# Patient Record
Sex: FEMALE | Race: OTHER | NOT HISPANIC OR LATINO | URBAN - METROPOLITAN AREA
[De-identification: names, ages, dates, MRNs, and addresses within clinical notes are randomized per-mention and may not be internally consistent; named-entity substitution may affect disease eponyms.]

---

## 2017-10-05 ENCOUNTER — OUTPATIENT (OUTPATIENT)
Dept: OUTPATIENT SERVICES | Facility: HOSPITAL | Age: 72
LOS: 1 days | Discharge: HOME | End: 2017-10-05

## 2017-10-11 DIAGNOSIS — H25.12 AGE-RELATED NUCLEAR CATARACT, LEFT EYE: ICD-10-CM

## 2017-10-11 DIAGNOSIS — Z87.891 PERSONAL HISTORY OF NICOTINE DEPENDENCE: ICD-10-CM

## 2017-10-11 DIAGNOSIS — J44.9 CHRONIC OBSTRUCTIVE PULMONARY DISEASE, UNSPECIFIED: ICD-10-CM

## 2017-10-19 ENCOUNTER — OUTPATIENT (OUTPATIENT)
Dept: OUTPATIENT SERVICES | Facility: HOSPITAL | Age: 72
LOS: 1 days | Discharge: HOME | End: 2017-10-19

## 2017-10-25 DIAGNOSIS — Z92.3 PERSONAL HISTORY OF IRRADIATION: ICD-10-CM

## 2017-10-25 DIAGNOSIS — J44.9 CHRONIC OBSTRUCTIVE PULMONARY DISEASE, UNSPECIFIED: ICD-10-CM

## 2017-10-25 DIAGNOSIS — Z87.891 PERSONAL HISTORY OF NICOTINE DEPENDENCE: ICD-10-CM

## 2017-10-25 DIAGNOSIS — Z85.72 PERSONAL HISTORY OF NON-HODGKIN LYMPHOMAS: ICD-10-CM

## 2017-10-25 DIAGNOSIS — Z92.21 PERSONAL HISTORY OF ANTINEOPLASTIC CHEMOTHERAPY: ICD-10-CM

## 2017-10-25 DIAGNOSIS — H25.11 AGE-RELATED NUCLEAR CATARACT, RIGHT EYE: ICD-10-CM

## 2019-11-03 ENCOUNTER — TRANSCRIPTION ENCOUNTER (OUTPATIENT)
Age: 74
End: 2019-11-03

## 2020-04-08 ENCOUNTER — TRANSCRIPTION ENCOUNTER (OUTPATIENT)
Age: 75
End: 2020-04-08

## 2020-06-03 ENCOUNTER — TRANSCRIPTION ENCOUNTER (OUTPATIENT)
Age: 75
End: 2020-06-03

## 2020-09-17 ENCOUNTER — TRANSCRIPTION ENCOUNTER (OUTPATIENT)
Age: 75
End: 2020-09-17

## 2020-10-01 ENCOUNTER — TRANSCRIPTION ENCOUNTER (OUTPATIENT)
Age: 75
End: 2020-10-01

## 2020-10-03 ENCOUNTER — TRANSCRIPTION ENCOUNTER (OUTPATIENT)
Age: 75
End: 2020-10-03

## 2021-04-15 ENCOUNTER — TRANSCRIPTION ENCOUNTER (OUTPATIENT)
Age: 76
End: 2021-04-15

## 2021-04-23 ENCOUNTER — TRANSCRIPTION ENCOUNTER (OUTPATIENT)
Age: 76
End: 2021-04-23

## 2022-07-06 ENCOUNTER — NON-APPOINTMENT (OUTPATIENT)
Age: 77
End: 2022-07-06

## 2023-02-18 ENCOUNTER — NON-APPOINTMENT (OUTPATIENT)
Age: 78
End: 2023-02-18

## 2023-06-01 ENCOUNTER — EMERGENCY (EMERGENCY)
Facility: HOSPITAL | Age: 78
LOS: 1 days | Discharge: LEFT BEFORE DISCHARGE | End: 2023-06-01
Admitting: STUDENT IN AN ORGANIZED HEALTH CARE EDUCATION/TRAINING PROGRAM
Payer: MEDICARE

## 2023-06-01 VITALS
OXYGEN SATURATION: 93 % | TEMPERATURE: 98 F | RESPIRATION RATE: 18 BRPM | DIASTOLIC BLOOD PRESSURE: 94 MMHG | HEART RATE: 65 BPM | SYSTOLIC BLOOD PRESSURE: 166 MMHG

## 2023-06-01 PROCEDURE — L9991: CPT

## 2023-06-01 NOTE — ED ADULT NURSE NOTE - CAS TRG GENERAL AIRWAY, MLM
HR=81 bpm, DBNG=518/86 mmhg, SfH6=302.0 %, Resp=12 B/min, EtCO2=35 mmHg, Apnea=4 Seconds, Pain=0, Wendi=9, Short=2 Patent

## 2023-06-01 NOTE — ED ADULT NURSE NOTE - NSICDXPASTMEDICALHX_GEN_ALL_CORE_FT
PAST MEDICAL HISTORY:  COPD (chronic obstructive pulmonary disease)     History of TIAs     Lymphoma

## 2023-06-01 NOTE — ED ADULT TRIAGE NOTE - ARRIVAL INFO ADDITIONAL COMMENTS
pt sent from [facility] after presenting there with headache x1 week and having episode of transient weakness at 1815.  no symptoms now.

## 2023-06-01 NOTE — ED ADULT NURSE NOTE - NSFALLUNIVINTERV_ED_ALL_ED
Bed/Stretcher in lowest position, wheels locked, appropriate side rails in place/Call bell, personal items and telephone in reach/Instruct patient to call for assistance before getting out of bed/chair/stretcher/Non-slip footwear applied when patient is off stretcher/[place] to call system/Physically safe environment - no spills, clutter or unnecessary equipment/Purposeful proactive rounding/Room/bathroom lighting operational, light cord in reach

## 2023-06-01 NOTE — ED ADULT NURSE NOTE - NURSING NEURO LEVEL OF CONSCIOUSNESS
Addended by: ANAND OLIVEIRA on: 5/22/2023 02:27 PM     Modules accepted: Orders     alert and awake/follows commands

## 2023-06-01 NOTE — ED ADULT NURSE NOTE - OBJECTIVE STATEMENT
79 yo F PMHx lymphoma, TIAs, COPD presents to the ED sent from [facility] after presenting there with headache x 1 week and pt "feeling like I was on a boat" around 1815. Pt awake and alert, AOx4. Pt states, "the doctor did not want to be responsible for sending me home, so he sent me here, but I want to go home. This is silly, I think they are just worried because of my history, but I really just need a head CT and some Tylenol." Pt also reports some nausea. Pt reports the feeling of being on a boat has gone away. Pt breathing spontaneously and unlabored. Pt denies weakness, numbness, tingling, vision changes, CP, SOB, V/D, f/c, lightheadedness, dizziness.       no symptoms now.

## 2023-06-01 NOTE — ED ADULT NURSE REASSESSMENT NOTE - NS ED NURSE REASSESS COMMENT FT1
Pt expressed desire to leave to RN. Pt advised to stay and be seen by MD. Pt refused. MD. Wilson made aware of situation. Pt awake and alert, AOx4. Pt walked out of ED with steady gait.

## 2023-06-04 DIAGNOSIS — R11.0 NAUSEA: ICD-10-CM

## 2023-06-04 DIAGNOSIS — R51.9 HEADACHE, UNSPECIFIED: ICD-10-CM

## 2023-06-04 DIAGNOSIS — Z53.21 PROCEDURE AND TREATMENT NOT CARRIED OUT DUE TO PATIENT LEAVING PRIOR TO BEING SEEN BY HEALTH CARE PROVIDER: ICD-10-CM

## 2023-12-20 PROBLEM — J44.9 CHRONIC OBSTRUCTIVE PULMONARY DISEASE, UNSPECIFIED: Chronic | Status: ACTIVE | Noted: 2023-06-01

## 2023-12-20 PROBLEM — Z86.73 PERSONAL HISTORY OF TRANSIENT ISCHEMIC ATTACK (TIA), AND CEREBRAL INFARCTION WITHOUT RESIDUAL DEFICITS: Chronic | Status: ACTIVE | Noted: 2023-06-01

## 2023-12-20 PROBLEM — C85.90 NON-HODGKIN LYMPHOMA, UNSPECIFIED, UNSPECIFIED SITE: Chronic | Status: ACTIVE | Noted: 2023-06-01

## 2023-12-26 ENCOUNTER — TRANSCRIPTION ENCOUNTER (OUTPATIENT)
Age: 78
End: 2023-12-26

## 2023-12-26 ENCOUNTER — NON-APPOINTMENT (OUTPATIENT)
Age: 78
End: 2023-12-26

## 2023-12-26 ENCOUNTER — APPOINTMENT (OUTPATIENT)
Dept: CARDIOLOGY | Facility: CLINIC | Age: 78
End: 2023-12-26
Payer: MEDICARE

## 2023-12-26 VITALS
WEIGHT: 164 LBS | DIASTOLIC BLOOD PRESSURE: 54 MMHG | HEART RATE: 75 BPM | OXYGEN SATURATION: 91 % | SYSTOLIC BLOOD PRESSURE: 112 MMHG | HEIGHT: 66 IN | BODY MASS INDEX: 26.36 KG/M2

## 2023-12-26 DIAGNOSIS — R20.2 ANESTHESIA OF SKIN: ICD-10-CM

## 2023-12-26 DIAGNOSIS — R20.0 ANESTHESIA OF SKIN: ICD-10-CM

## 2023-12-26 DIAGNOSIS — Z85.72 PERSONAL HISTORY OF NON-HODGKIN LYMPHOMAS: ICD-10-CM

## 2023-12-26 DIAGNOSIS — R42 DIZZINESS AND GIDDINESS: ICD-10-CM

## 2023-12-26 DIAGNOSIS — Z00.00 ENCOUNTER FOR GENERAL ADULT MEDICAL EXAMINATION W/OUT ABNORMAL FINDINGS: ICD-10-CM

## 2023-12-26 DIAGNOSIS — Z87.891 PERSONAL HISTORY OF NICOTINE DEPENDENCE: ICD-10-CM

## 2023-12-26 DIAGNOSIS — E03.9 HYPOTHYROIDISM, UNSPECIFIED: ICD-10-CM

## 2023-12-26 PROCEDURE — 99204 OFFICE O/P NEW MOD 45 MIN: CPT

## 2023-12-26 PROCEDURE — 93000 ELECTROCARDIOGRAM COMPLETE: CPT

## 2023-12-26 NOTE — CARDIOLOGY SUMMARY
[de-identified] : 12/26/2023   Sinus Rhythm , -RSR' in V2,  WITHIN NORMAL LIMITS [de-identified] : Reported negative MPI year ago. [de-identified] : Laboratory from 8/17/2023 includes normal TSH and normal T4, hemoglobin 14.2, hematocrit 44.1%, total cholesterol 155, HDL cholesterol 55, LDL 85, triglycerides 77, normal LFTs, creatinine 0.57, K 4.5, Na 142, eGFR 92

## 2023-12-26 NOTE — HISTORY OF PRESENT ILLNESS
[FreeTextEntry1] : 78-year-old woman referred to cardiology with COPD, previous tobacco use, hyperlipidemia on statin with a severe bout of vertigo during a  trip.  The vertigo continues and is usually exacerbated by changing the position of her head.  She is being evaluated ENT and has a neurology evaluation next week.  There is no associated weakness, slurring of speech, hearing loss or tinnitus.  She also complains of pins and needles both legs.  She sometimes feels off balance with mild lightheadedness when standing.  There is no history of palpitations or syncope.  She has a history of COPD and has had shortness of breath when walking for several years.  She had an imaging stress test last year that she reports was negative.  She developed shortness of breath walking 2 blocks or up and down stairs.  She is able to do Pilates shortness of breath.  She does not have orthopnea, PND or peripheral edema.  She smoked for many years but stopped 20 years ago.  There is no prior history of a clinical myocardial infarction, coronary revascularization, symptomatic congestive heart failure, rheumatic heart disease, valvular disease, symptomatic arrhythmias including atrial fibrillation.  There is no history of cerebrovascular events. There is no history of hypertension, diabetes, or renal insufficiency.

## 2023-12-26 NOTE — REVIEW OF SYSTEMS
[Negative] : Respiratory [Vertigo] : vertigo [SOB] : shortness of breath [Dyspnea on exertion] : dyspnea during exertion [Heartburn] : heartburn [Dizziness] : dizziness [Numbness (Hypoesthesia)] : numbness [Tingling (Paresthesia)] : tingling [Anxiety] : anxiety [Under Stress] : under stress [Hearing Loss] : no hearing loss [Tinnitus] : no tinnitus [Chest Discomfort] : no chest discomfort [Lower Ext Edema] : no extremity edema [Leg Claudication] : no intermittent leg claudication [Orthopnea] : no orthopnea [PND] : no PND [Syncope] : no syncope [Cough] : no cough [Wheezing] : no wheezing [Abdominal Pain] : no abdominal pain [Nausea] : no nausea [Vomiting] : no vomiting [Dysuria] : no dysuria [Pelvic Pain] : no pelvic pain [Convulsions] : no convulsions [Weakness] : no weakness [Limb Weakness (Paresis)] : no limb weakness (Paresis) [Speech Disturbance] : no speech disturbance [FreeTextEntry8] : nocturia

## 2023-12-26 NOTE — PHYSICAL EXAM
[Well Developed] : well developed [Well Nourished] : well nourished [No Acute Distress] : no acute distress [Normal Conjunctiva] : normal conjunctiva [Normal Venous Pressure] : normal venous pressure [No Carotid Bruit] : no carotid bruit [Normal S1, S2] : normal S1, S2 [No Rub] : no rub [No Gallop] : no gallop [Clear Lung Fields] : clear lung fields [No Respiratory Distress] : no respiratory distress  [Good Air Entry] : good air entry [Soft] : abdomen soft [Non Tender] : non-tender [Normal Bowel Sounds] : normal bowel sounds [Normal Gait] : normal gait [No Edema] : no edema [No Rash] : no rash [No Focal Deficits] : no focal deficits [Normal Speech] : normal speech [Alert and Oriented] : alert and oriented [Normal memory] : normal memory [No Murmur] : no murmur [Normal Radial B/L] : normal radial B/L [de-identified] : supine 98/56, HR 72; sitting 104/60, HR 76, standing 104/60  HR 80

## 2023-12-26 NOTE — ASSESSMENT
[FreeTextEntry1] : 78-year-old woman with hyperlipidemia on statin, positional vertigo, COPD with longstanding dyspnea and positional lightheadedness.  Physical examination today shows no evidence of orthostatic hypotension.

## 2023-12-26 NOTE — DISCUSSION/SUMMARY
[EKG obtained to assist in diagnosis and management of assessed problem(s)] : EKG obtained to assist in diagnosis and management of assessed problem(s) [FreeTextEntry1] : 1. We reviewed the importance of a healthy lifestyle including: normal body weight, regular physical activity with 30 to 45 minutes of exercise most days of the week, Mediterranean style diet. 2.   Transthoracic echocardiogram (TTE) for evaluation of cardiac structure and function in the setting of COPD and SOB. 3. Event monitor - 5 day 4. She will obtain results of stress test 5, Recommended she move slowly from the supine to sitting or standing position. We reviewed counter pressure maneuvers for orthostatic hypotension including leg crossing, arm tensing and squatting. Maintain hydration and salt intake. 6.  Neurology and ENT evaluation are in progress. 7.  Scheduled cardiology follow-up will be after the above testing or sooner prn.

## 2024-01-09 ENCOUNTER — APPOINTMENT (OUTPATIENT)
Dept: CARDIOLOGY | Facility: CLINIC | Age: 79
End: 2024-01-09
Payer: MEDICARE

## 2024-01-09 VITALS
HEART RATE: 70 BPM | WEIGHT: 163 LBS | DIASTOLIC BLOOD PRESSURE: 66 MMHG | BODY MASS INDEX: 26.31 KG/M2 | OXYGEN SATURATION: 94 % | SYSTOLIC BLOOD PRESSURE: 120 MMHG

## 2024-01-09 PROCEDURE — 93306 TTE W/DOPPLER COMPLETE: CPT

## 2024-01-09 PROCEDURE — 99214 OFFICE O/P EST MOD 30 MIN: CPT

## 2024-01-09 RX ORDER — FLUOXETINE HYDROCHLORIDE 40 MG/1
40 CAPSULE ORAL
Refills: 0 | Status: ACTIVE | COMMUNITY
Start: 2023-10-30

## 2024-01-09 RX ORDER — ATORVASTATIN CALCIUM 10 MG/1
10 TABLET, FILM COATED ORAL DAILY
Refills: 0 | Status: ACTIVE | COMMUNITY
Start: 2023-08-24

## 2024-01-09 RX ORDER — LEVOTHYROXINE SODIUM 112 UG/1
112 TABLET ORAL DAILY
Refills: 0 | Status: ACTIVE | COMMUNITY
Start: 2023-08-24

## 2024-01-09 RX ORDER — FLUTICASONE FUROATE, UMECLIDINIUM BROMIDE AND VILANTEROL TRIFENATATE 100; 62.5; 25 UG/1; UG/1; UG/1
100-62.5-25 POWDER RESPIRATORY (INHALATION)
Refills: 0 | Status: ACTIVE | COMMUNITY
Start: 2023-05-17

## 2024-03-05 ENCOUNTER — APPOINTMENT (OUTPATIENT)
Dept: CARDIOLOGY | Facility: CLINIC | Age: 79
End: 2024-03-05
Payer: MEDICARE

## 2024-03-05 VITALS
WEIGHT: 165 LBS | BODY MASS INDEX: 26.63 KG/M2 | DIASTOLIC BLOOD PRESSURE: 66 MMHG | HEART RATE: 67 BPM | OXYGEN SATURATION: 92 % | SYSTOLIC BLOOD PRESSURE: 128 MMHG

## 2024-03-05 DIAGNOSIS — I27.20 PULMONARY HYPERTENSION, UNSPECIFIED: ICD-10-CM

## 2024-03-05 DIAGNOSIS — J44.9 CHRONIC OBSTRUCTIVE PULMONARY DISEASE, UNSPECIFIED: ICD-10-CM

## 2024-03-05 DIAGNOSIS — I35.0 NONRHEUMATIC AORTIC (VALVE) STENOSIS: ICD-10-CM

## 2024-03-05 DIAGNOSIS — E78.5 HYPERLIPIDEMIA, UNSPECIFIED: ICD-10-CM

## 2024-03-05 DIAGNOSIS — I34.0 NONRHEUMATIC MITRAL (VALVE) INSUFFICIENCY: ICD-10-CM

## 2024-03-05 DIAGNOSIS — R42 DIZZINESS AND GIDDINESS: ICD-10-CM

## 2024-03-05 DIAGNOSIS — H93.13 TINNITUS, BILATERAL: ICD-10-CM

## 2024-03-05 PROCEDURE — 99213 OFFICE O/P EST LOW 20 MIN: CPT

## 2024-03-05 NOTE — ASSESSMENT
[FreeTextEntry1] : 78-year-old woman with hyperlipidemia on statin, positional vertigo, hearing loss and tinnitus.  Vertigo has improved with vestibular rehabilitation.  COPD with longstanding dyspnea, negative MPI 8/22, TTE that demonstrates mild pulmonary hypertension, mild AS and mild to moderate MR.  Event monitor without sustained arrhythmia that would explain her symptoms and sinus rhythm during symptoms.

## 2024-03-05 NOTE — REASON FOR VISIT
[Symptom and Test Evaluation] : symptom and test evaluation [FreeTextEntry1] : Follow-up of vertigo and lightheadedness.

## 2024-03-05 NOTE — HISTORY OF PRESENT ILLNESS
[FreeTextEntry1] : 78-year-old woman referred to cardiology with COPD, previous tobacco use, hyperlipidemia on statin with a severe bout of vertigo during a  trip.  The vertigo continues and is usually exacerbated by changing the position of her head.  Her hearing has been decreasing over period of about 2 years and she also reports tinnitus.  She is being evaluated ENT and neurology evaluation is pending.  There is no associated weakness, slurring of speech.  She also complains of pins and needles both legs.  She sometimes feels off balance with mild lightheadedness when standing.  There is no history of palpitations or syncope.  She has a history of COPD and has had shortness of breath when walking for several years.  She had MPI 8/24/2022 that was negative.  She developed shortness of breath walking 2 blocks or up and down stairs.  She is able to do Pilates shortness of breath.  She does not have orthopnea, PND or peripheral edema.  She smoked for many years but stopped 20 years ago.  There is no prior history of a clinical myocardial infarction, coronary revascularization, symptomatic congestive heart failure, rheumatic heart disease, valvular disease, symptomatic arrhythmias including atrial fibrillation.  There is no history of cerebrovascular events. There is no history of hypertension, diabetes, or renal insufficiency.  She has started vestibular rehabilitation and is having less vertigo.  She is not having palpitations or presyncope.  She remains active including Pilates without chest pain.  She has chronic shortness of breath that is unchanged.

## 2024-03-05 NOTE — DISCUSSION/SUMMARY
[FreeTextEntry1] : 1.  Continued healthy lifestyle. 2.  Scheduled cardiology follow-up with echocardiogram will be in 1 year or sooner prn for increasing shortness of breath, lightheadedness or chest pain.

## 2024-03-05 NOTE — REVIEW OF SYSTEMS
[Vertigo] : vertigo [SOB] : shortness of breath [Dyspnea on exertion] : dyspnea during exertion [Heartburn] : heartburn [Dizziness] : dizziness [Numbness (Hypoesthesia)] : numbness [Tingling (Paresthesia)] : tingling [Anxiety] : anxiety [Under Stress] : under stress [Negative] : Respiratory [Hearing Loss] : no hearing loss [Chest Discomfort] : no chest discomfort [Tinnitus] : no tinnitus [Lower Ext Edema] : no extremity edema [Leg Claudication] : no intermittent leg claudication [Syncope] : no syncope [Orthopnea] : no orthopnea [PND] : no PND [Cough] : no cough [Wheezing] : no wheezing [Abdominal Pain] : no abdominal pain [Nausea] : no nausea [Dysuria] : no dysuria [Vomiting] : no vomiting [Convulsions] : no convulsions [Pelvic Pain] : no pelvic pain [Limb Weakness (Paresis)] : no limb weakness (Paresis) [Weakness] : no weakness [FreeTextEntry8] : nocturia [Speech Disturbance] : no speech disturbance

## 2024-03-05 NOTE — CARDIOLOGY SUMMARY
[de-identified] : 12/26/2023   Sinus Rhythm , -RSR' in V2,  WITHIN NORMAL LIMITS [de-identified] : 12/26/2023 4 days 21 hours predominant rhythm was sinus average heart rate of 73.  There was 1 run of ventricular tachycardia lasting 6 beats without associated symptoms.  There were 18 SVTs fastest 8 beats with a maximum heart rate of 185 and the longest 15 beats with an average heart rate of 143 all without symptoms.  She reported symptoms associated with sinus rhythm rate 82. [de-identified] : Reported negative MPI year ago. [de-identified] : 1/9/2024 normal LV systolic function with an estimated LVEF of 60 to 65%.  Moderate LAE.  Mild CARMELA.  Mild aortic stenosis with peak/mean transaortic gradient 22/11 mmHg.  Estimated aortic valve area 1.97 cm, mild to moderate mitral regurgitation, mild tricuspid regurgitation, estimated pulmonary systolic pressure is 41 mmHg consistent with mild pulmonary hypertension. [de-identified] : Exercise MPI 8/24/2022 at [place] [facility].  She exercised to stage II of a Macario protocol, heart rate erika from 70 to a peak of 116 bpm at peak exercise, 82% of maximum predicted.  There were no significant ST-T wave changes and no chest discomfort at peak exercise.  She had increasing dyspnea with exercise.  There were frequent PACs at peak exercise and a rare PVC.  Post-rest and delayed myocardial scintigraphy revealed normal distribution no evidence of ischemia or infarction.  Nuclear LVEF was 61% poststress. [de-identified] : Laboratory from 8/17/2023 includes normal TSH and normal T4, hemoglobin 14.2, hematocrit 44.1%, total cholesterol 155, HDL cholesterol 55, LDL 85, triglycerides 77, normal LFTs, creatinine 0.57, K 4.5, Na 142, eGFR 92

## 2024-03-05 NOTE — PHYSICAL EXAM
[Well Developed] : well developed [Well Nourished] : well nourished [Normal Conjunctiva] : normal conjunctiva [No Acute Distress] : no acute distress [Normal Venous Pressure] : normal venous pressure [No Carotid Bruit] : no carotid bruit [Normal S1, S2] : normal S1, S2 [No Murmur] : no murmur [No Rub] : no rub [Clear Lung Fields] : clear lung fields [No Gallop] : no gallop [Good Air Entry] : good air entry [No Respiratory Distress] : no respiratory distress  [Non Tender] : non-tender [Soft] : abdomen soft [Normal Gait] : normal gait [Normal Bowel Sounds] : normal bowel sounds [Normal Radial B/L] : normal radial B/L [No Edema] : no edema [No Rash] : no rash [No Focal Deficits] : no focal deficits [Alert and Oriented] : alert and oriented [Normal Speech] : normal speech [Normal memory] : normal memory [de-identified] : supine 98/56, HR 72; sitting 104/60, HR 76, standing 104/60  HR 80 [Murmur] : murmur [de-identified] : Grade 1/6 short MARIPOSA at right upper sternal border

## 2024-06-09 ENCOUNTER — NON-APPOINTMENT (OUTPATIENT)
Age: 79
End: 2024-06-09

## 2024-10-24 ENCOUNTER — APPOINTMENT (OUTPATIENT)
Dept: CARDIOLOGY | Facility: CLINIC | Age: 79
End: 2024-10-24
Payer: MEDICARE

## 2024-10-24 ENCOUNTER — NON-APPOINTMENT (OUTPATIENT)
Age: 79
End: 2024-10-24

## 2024-10-24 VITALS
BODY MASS INDEX: 26.36 KG/M2 | SYSTOLIC BLOOD PRESSURE: 124 MMHG | DIASTOLIC BLOOD PRESSURE: 60 MMHG | OXYGEN SATURATION: 90 % | WEIGHT: 164 LBS | HEART RATE: 69 BPM | HEIGHT: 66 IN

## 2024-10-24 DIAGNOSIS — R42 DIZZINESS AND GIDDINESS: ICD-10-CM

## 2024-10-24 DIAGNOSIS — I47.29 OTHER VENTRICULAR TACHYCARDIA: ICD-10-CM

## 2024-10-24 DIAGNOSIS — E78.5 HYPERLIPIDEMIA, UNSPECIFIED: ICD-10-CM

## 2024-10-24 DIAGNOSIS — Z86.79 PERSONAL HISTORY OF OTHER DISEASES OF THE CIRCULATORY SYSTEM: ICD-10-CM

## 2024-10-24 DIAGNOSIS — I35.0 NONRHEUMATIC AORTIC (VALVE) STENOSIS: ICD-10-CM

## 2024-10-24 DIAGNOSIS — I27.20 PULMONARY HYPERTENSION, UNSPECIFIED: ICD-10-CM

## 2024-10-24 DIAGNOSIS — J44.9 CHRONIC OBSTRUCTIVE PULMONARY DISEASE, UNSPECIFIED: ICD-10-CM

## 2024-10-24 DIAGNOSIS — I47.10 SUPRAVENTRICULAR TACHYCARDIA, UNSPECIFIED: ICD-10-CM

## 2024-10-24 PROCEDURE — 93242 EXT ECG>48HR<7D RECORDING: CPT

## 2024-10-24 PROCEDURE — 99214 OFFICE O/P EST MOD 30 MIN: CPT

## 2024-10-24 RX ORDER — ATORVASTATIN CALCIUM 20 MG/1
20 TABLET, FILM COATED ORAL DAILY
Refills: 0 | Status: ACTIVE | COMMUNITY

## 2024-10-24 RX ORDER — MIRABEGRON 25 MG/1
25 TABLET, FILM COATED, EXTENDED RELEASE ORAL DAILY
Refills: 0 | Status: ACTIVE | COMMUNITY

## 2024-10-24 RX ORDER — VIT C/E/ZN/COPPR/LUTEIN/ZEAXAN 250MG-90MG
CAPSULE ORAL DAILY
Refills: 0 | Status: ACTIVE | COMMUNITY

## 2024-10-24 RX ORDER — ZANUBRUTINIB 80 MG/1
80 CAPSULE, GELATIN COATED ORAL DAILY
Refills: 0 | Status: ACTIVE | COMMUNITY

## 2024-11-06 PROCEDURE — 93244 EXT ECG>48HR<7D REV&INTERPJ: CPT

## 2025-01-14 ENCOUNTER — NON-APPOINTMENT (OUTPATIENT)
Age: 80
End: 2025-01-14

## 2025-01-27 ENCOUNTER — NON-APPOINTMENT (OUTPATIENT)
Age: 80
End: 2025-01-27

## 2025-01-29 PROBLEM — I47.29 NONSUSTAINED VENTRICULAR TACHYCARDIA: Status: RESOLVED | Noted: 2024-10-24 | Resolved: 2025-01-29

## 2025-01-30 ENCOUNTER — APPOINTMENT (OUTPATIENT)
Dept: CARDIOLOGY | Facility: CLINIC | Age: 80
End: 2025-01-30
Payer: MEDICARE

## 2025-01-30 VITALS
HEIGHT: 66 IN | DIASTOLIC BLOOD PRESSURE: 68 MMHG | SYSTOLIC BLOOD PRESSURE: 120 MMHG | BODY MASS INDEX: 26.2 KG/M2 | WEIGHT: 163 LBS | HEART RATE: 77 BPM | OXYGEN SATURATION: 91 %

## 2025-01-30 DIAGNOSIS — I35.0 NONRHEUMATIC AORTIC (VALVE) STENOSIS: ICD-10-CM

## 2025-01-30 DIAGNOSIS — I47.29 OTHER VENTRICULAR TACHYCARDIA: ICD-10-CM

## 2025-01-30 DIAGNOSIS — R06.02 SHORTNESS OF BREATH: ICD-10-CM

## 2025-01-30 DIAGNOSIS — Z86.79 PERSONAL HISTORY OF OTHER DISEASES OF THE CIRCULATORY SYSTEM: ICD-10-CM

## 2025-01-30 DIAGNOSIS — I34.0 NONRHEUMATIC MITRAL (VALVE) INSUFFICIENCY: ICD-10-CM

## 2025-01-30 DIAGNOSIS — J44.9 CHRONIC OBSTRUCTIVE PULMONARY DISEASE, UNSPECIFIED: ICD-10-CM

## 2025-01-30 DIAGNOSIS — I27.20 PULMONARY HYPERTENSION, UNSPECIFIED: ICD-10-CM

## 2025-01-30 DIAGNOSIS — I47.10 SUPRAVENTRICULAR TACHYCARDIA, UNSPECIFIED: ICD-10-CM

## 2025-01-30 DIAGNOSIS — E78.5 HYPERLIPIDEMIA, UNSPECIFIED: ICD-10-CM

## 2025-01-30 DIAGNOSIS — R42 DIZZINESS AND GIDDINESS: ICD-10-CM

## 2025-01-30 PROCEDURE — 93880 EXTRACRANIAL BILAT STUDY: CPT

## 2025-01-30 PROCEDURE — 99213 OFFICE O/P EST LOW 20 MIN: CPT

## 2025-01-30 PROCEDURE — 93306 TTE W/DOPPLER COMPLETE: CPT

## 2025-02-02 ENCOUNTER — NON-APPOINTMENT (OUTPATIENT)
Age: 80
End: 2025-02-02

## 2025-02-03 ENCOUNTER — NON-APPOINTMENT (OUTPATIENT)
Age: 80
End: 2025-02-03

## 2025-02-04 ENCOUNTER — APPOINTMENT (OUTPATIENT)
Dept: CARDIOLOGY | Facility: CLINIC | Age: 80
End: 2025-02-04

## 2025-02-22 ENCOUNTER — NON-APPOINTMENT (OUTPATIENT)
Age: 80
End: 2025-02-22

## 2025-03-04 ENCOUNTER — APPOINTMENT (OUTPATIENT)
Dept: CARDIOLOGY | Facility: CLINIC | Age: 80
End: 2025-03-04
Payer: MEDICARE

## 2025-03-04 VITALS
WEIGHT: 160 LBS | HEART RATE: 93 BPM | SYSTOLIC BLOOD PRESSURE: 102 MMHG | BODY MASS INDEX: 25.71 KG/M2 | HEIGHT: 66 IN | DIASTOLIC BLOOD PRESSURE: 60 MMHG | OXYGEN SATURATION: 95 %

## 2025-03-04 DIAGNOSIS — I83.90 ASYMPTOMATIC VARICOSE VEINS OF UNSPECIFIED LOWER EXTREMITY: ICD-10-CM

## 2025-03-04 DIAGNOSIS — R06.02 SHORTNESS OF BREATH: ICD-10-CM

## 2025-03-04 DIAGNOSIS — R42 DIZZINESS AND GIDDINESS: ICD-10-CM

## 2025-03-04 DIAGNOSIS — I27.20 PULMONARY HYPERTENSION, UNSPECIFIED: ICD-10-CM

## 2025-03-04 DIAGNOSIS — M79.2 NEURALGIA AND NEURITIS, UNSPECIFIED: ICD-10-CM

## 2025-03-04 DIAGNOSIS — C85.90 NON-HODGKIN LYMPHOMA, UNSPECIFIED, UNSPECIFIED SITE: ICD-10-CM

## 2025-03-04 DIAGNOSIS — J44.9 CHRONIC OBSTRUCTIVE PULMONARY DISEASE, UNSPECIFIED: ICD-10-CM

## 2025-03-04 DIAGNOSIS — I35.0 NONRHEUMATIC AORTIC (VALVE) STENOSIS: ICD-10-CM

## 2025-03-04 PROCEDURE — 99214 OFFICE O/P EST MOD 30 MIN: CPT

## 2025-03-11 ENCOUNTER — APPOINTMENT (OUTPATIENT)
Dept: ORTHOPEDIC SURGERY | Facility: CLINIC | Age: 80
End: 2025-03-11
Payer: MEDICARE

## 2025-03-11 DIAGNOSIS — M76.62 ACHILLES TENDINITIS, LEFT LEG: ICD-10-CM

## 2025-03-11 PROCEDURE — 73590 X-RAY EXAM OF LOWER LEG: CPT | Mod: LT

## 2025-03-11 PROCEDURE — 99203 OFFICE O/P NEW LOW 30 MIN: CPT

## 2025-03-19 ENCOUNTER — APPOINTMENT (OUTPATIENT)
Dept: VASCULAR SURGERY | Facility: CLINIC | Age: 80
End: 2025-03-19

## 2025-03-25 ENCOUNTER — APPOINTMENT (OUTPATIENT)
Dept: ORTHOPEDIC SURGERY | Facility: CLINIC | Age: 80
End: 2025-03-25

## 2025-03-31 ENCOUNTER — NON-APPOINTMENT (OUTPATIENT)
Age: 80
End: 2025-03-31

## 2025-04-02 ENCOUNTER — APPOINTMENT (OUTPATIENT)
Dept: VASCULAR SURGERY | Facility: CLINIC | Age: 80
End: 2025-04-02

## 2025-04-02 VITALS
BODY MASS INDEX: 25.71 KG/M2 | HEART RATE: 77 BPM | OXYGEN SATURATION: 97 % | DIASTOLIC BLOOD PRESSURE: 82 MMHG | SYSTOLIC BLOOD PRESSURE: 142 MMHG | WEIGHT: 160 LBS | HEIGHT: 66 IN

## 2025-04-02 DIAGNOSIS — I87.2 VENOUS INSUFFICIENCY (CHRONIC) (PERIPHERAL): ICD-10-CM

## 2025-04-02 DIAGNOSIS — I83.90 ASYMPTOMATIC VARICOSE VEINS OF UNSPECIFIED LOWER EXTREMITY: ICD-10-CM

## 2025-04-02 DIAGNOSIS — J44.9 CHRONIC OBSTRUCTIVE PULMONARY DISEASE, UNSPECIFIED: ICD-10-CM

## 2025-04-02 DIAGNOSIS — C85.90 NON-HODGKIN LYMPHOMA, UNSPECIFIED, UNSPECIFIED SITE: ICD-10-CM

## 2025-04-02 DIAGNOSIS — Z78.9 OTHER SPECIFIED HEALTH STATUS: ICD-10-CM

## 2025-04-02 DIAGNOSIS — Z79.899 OTHER LONG TERM (CURRENT) DRUG THERAPY: ICD-10-CM

## 2025-04-02 PROCEDURE — 99203 OFFICE O/P NEW LOW 30 MIN: CPT

## 2025-04-02 RX ORDER — CHOLECALCIFEROL (VITAMIN D3) 1250 MCG
1.25 MG CAPSULE ORAL
Refills: 0 | Status: ACTIVE | COMMUNITY

## 2025-04-02 RX ORDER — FLUOXETINE HYDROCHLORIDE 40 MG/1
CAPSULE ORAL
Refills: 0 | Status: ACTIVE | COMMUNITY

## 2025-04-02 RX ORDER — ZANUBRUTINIB 80 MG/1
CAPSULE, GELATIN COATED ORAL
Refills: 0 | Status: ACTIVE | COMMUNITY

## 2025-04-07 PROBLEM — Z78.9 CAFFEINE USE: Status: ACTIVE | Noted: 2025-04-07

## 2025-04-07 PROBLEM — C85.90 NON HODGKIN'S LYMPHOMA: Status: ACTIVE | Noted: 2025-04-07

## 2025-04-07 PROBLEM — J44.9 COPD (CHRONIC OBSTRUCTIVE PULMONARY DISEASE): Status: ACTIVE | Noted: 2025-04-07

## 2025-04-07 PROBLEM — Z79.899 ON STATIN THERAPY: Status: ACTIVE | Noted: 2025-04-07

## 2025-05-06 ENCOUNTER — APPOINTMENT (OUTPATIENT)
Dept: VASCULAR SURGERY | Facility: CLINIC | Age: 80
End: 2025-05-06
Payer: MEDICARE

## 2025-05-06 PROCEDURE — 93970 EXTREMITY STUDY: CPT

## 2025-05-07 ENCOUNTER — APPOINTMENT (OUTPATIENT)
Dept: VASCULAR SURGERY | Facility: CLINIC | Age: 80
End: 2025-05-07
Payer: MEDICARE

## 2025-05-07 VITALS
WEIGHT: 158 LBS | HEIGHT: 66 IN | SYSTOLIC BLOOD PRESSURE: 116 MMHG | HEART RATE: 74 BPM | BODY MASS INDEX: 25.39 KG/M2 | DIASTOLIC BLOOD PRESSURE: 66 MMHG | OXYGEN SATURATION: 91 %

## 2025-05-07 DIAGNOSIS — I87.2 VENOUS INSUFFICIENCY (CHRONIC) (PERIPHERAL): ICD-10-CM

## 2025-05-07 DIAGNOSIS — I83.90 ASYMPTOMATIC VARICOSE VEINS OF UNSPECIFIED LOWER EXTREMITY: ICD-10-CM

## 2025-05-07 PROCEDURE — 99213 OFFICE O/P EST LOW 20 MIN: CPT

## 2025-07-28 ENCOUNTER — APPOINTMENT (OUTPATIENT)
Dept: CARDIOLOGY | Facility: CLINIC | Age: 80
End: 2025-07-28
Payer: MEDICARE

## 2025-07-28 VITALS
HEIGHT: 66 IN | SYSTOLIC BLOOD PRESSURE: 108 MMHG | BODY MASS INDEX: 26.03 KG/M2 | DIASTOLIC BLOOD PRESSURE: 56 MMHG | OXYGEN SATURATION: 92 % | WEIGHT: 162 LBS | HEART RATE: 73 BPM

## 2025-07-28 DIAGNOSIS — I35.0 NONRHEUMATIC AORTIC (VALVE) STENOSIS: ICD-10-CM

## 2025-07-28 DIAGNOSIS — E78.5 HYPERLIPIDEMIA, UNSPECIFIED: ICD-10-CM

## 2025-07-28 DIAGNOSIS — R06.02 SHORTNESS OF BREATH: ICD-10-CM

## 2025-07-28 DIAGNOSIS — J44.9 CHRONIC OBSTRUCTIVE PULMONARY DISEASE, UNSPECIFIED: ICD-10-CM

## 2025-07-28 PROCEDURE — 93000 ELECTROCARDIOGRAM COMPLETE: CPT

## 2025-07-28 PROCEDURE — 99204 OFFICE O/P NEW MOD 45 MIN: CPT

## 2025-07-31 ENCOUNTER — OFFICE (OUTPATIENT)
Dept: URBAN - METROPOLITAN AREA CLINIC 8 | Facility: CLINIC | Age: 80
Setting detail: OPHTHALMOLOGY
End: 2025-07-31
Payer: MEDICARE

## 2025-07-31 DIAGNOSIS — Z96.1: ICD-10-CM

## 2025-07-31 DIAGNOSIS — H35.3112: ICD-10-CM

## 2025-07-31 DIAGNOSIS — H04.123: ICD-10-CM

## 2025-07-31 DIAGNOSIS — H35.3221: ICD-10-CM

## 2025-07-31 PROCEDURE — 92004 COMPRE OPH EXAM NEW PT 1/>: CPT | Performed by: OPHTHALMOLOGY

## 2025-07-31 PROCEDURE — 92250 FUNDUS PHOTOGRAPHY W/I&R: CPT | Performed by: OPHTHALMOLOGY

## 2025-07-31 ASSESSMENT — TONOMETRY
OS_IOP_MMHG: 13
OD_IOP_MMHG: 12

## 2025-07-31 ASSESSMENT — VISUAL ACUITY
OS_BCVA: 20/30-2
OD_BCVA: CF 1FT

## 2025-07-31 ASSESSMENT — CONFRONTATIONAL VISUAL FIELD TEST (CVF)
OS_FINDINGS: FULL
OD_FINDINGS: FULL

## 2025-07-31 ASSESSMENT — REFRACTION_CURRENTRX
OD_SPHERE: +0.50
OS_ADD: +3.00
OS_AXIS: 071
OD_AXIS: 105
OS_OVR_VA: 20/
OD_CYLINDER: -1.00
OS_SPHERE: +0.75
OD_OVR_VA: 20/
OS_CYLINDER: -1.25
OD_ADD: +3.00

## 2025-07-31 ASSESSMENT — REFRACTION_MANIFEST
OD_ADD: +3.50
OD_VA2: 20/20(J1+)
OS_VA2: UNABLE
OD_CYLINDER: -1.00
OD_VA1: 20/30-
OS_SPHERE: UNABLE
OD_SPHERE: +0.25
OD_AXIS: 105
OS_ADD: +3.50
OU_VA: 20/30